# Patient Record
Sex: FEMALE | Race: WHITE | Employment: FULL TIME | URBAN - METROPOLITAN AREA
[De-identification: names, ages, dates, MRNs, and addresses within clinical notes are randomized per-mention and may not be internally consistent; named-entity substitution may affect disease eponyms.]

---

## 2019-09-25 ENCOUNTER — HOSPITAL ENCOUNTER (EMERGENCY)
Age: 59
Discharge: PSYCHIATRIC OR SUBSTANCE USE FACILITY | End: 2019-09-26
Attending: EMERGENCY MEDICINE
Payer: COMMERCIAL

## 2019-09-25 VITALS
OXYGEN SATURATION: 97 % | TEMPERATURE: 97.4 F | RESPIRATION RATE: 16 BRPM | HEART RATE: 86 BPM | SYSTOLIC BLOOD PRESSURE: 153 MMHG | DIASTOLIC BLOOD PRESSURE: 75 MMHG

## 2019-09-25 DIAGNOSIS — F31.2 BIPOLAR AFFECTIVE DISORDER, MANIC, SEVERE, WITH PSYCHOTIC BEHAVIOR (HCC): Primary | ICD-10-CM

## 2019-09-25 LAB
ALBUMIN SERPL-MCNC: 4 G/DL (ref 3.5–5)
ALBUMIN/GLOB SERPL: 1.3 {RATIO} (ref 1.1–2.2)
ALP SERPL-CCNC: 103 U/L (ref 45–117)
ALT SERPL-CCNC: 23 U/L (ref 12–78)
AMPHET UR QL SCN: NEGATIVE
ANION GAP SERPL CALC-SCNC: 7 MMOL/L (ref 5–15)
APPEARANCE UR: CLEAR
AST SERPL-CCNC: 12 U/L (ref 15–37)
BACTERIA URNS QL MICRO: NEGATIVE /HPF
BARBITURATES UR QL SCN: NEGATIVE
BASOPHILS # BLD: 0.1 K/UL (ref 0–0.1)
BASOPHILS NFR BLD: 1 % (ref 0–1)
BENZODIAZ UR QL: NEGATIVE
BILIRUB SERPL-MCNC: 0.2 MG/DL (ref 0.2–1)
BILIRUB UR QL: NEGATIVE
BUN SERPL-MCNC: 16 MG/DL (ref 6–20)
BUN/CREAT SERPL: 16 (ref 12–20)
CALCIUM SERPL-MCNC: 10 MG/DL (ref 8.5–10.1)
CANNABINOIDS UR QL SCN: NEGATIVE
CHLORIDE SERPL-SCNC: 106 MMOL/L (ref 97–108)
CO2 SERPL-SCNC: 27 MMOL/L (ref 21–32)
COCAINE UR QL SCN: NEGATIVE
COLOR UR: ABNORMAL
COMMENT, HOLDF: NORMAL
CREAT SERPL-MCNC: 0.97 MG/DL (ref 0.55–1.02)
DIFFERENTIAL METHOD BLD: NORMAL
DRUG SCRN COMMENT,DRGCM: NORMAL
EOSINOPHIL # BLD: 0.3 K/UL (ref 0–0.4)
EOSINOPHIL NFR BLD: 4 % (ref 0–7)
EPITH CASTS URNS QL MICRO: ABNORMAL /LPF
ERYTHROCYTE [DISTWIDTH] IN BLOOD BY AUTOMATED COUNT: 12.7 % (ref 11.5–14.5)
ETHANOL SERPL-MCNC: <10 MG/DL
GLOBULIN SER CALC-MCNC: 3.2 G/DL (ref 2–4)
GLUCOSE SERPL-MCNC: 106 MG/DL (ref 65–100)
GLUCOSE UR STRIP.AUTO-MCNC: NEGATIVE MG/DL
HCT VFR BLD AUTO: 45 % (ref 35–47)
HGB BLD-MCNC: 14.9 G/DL (ref 11.5–16)
HGB UR QL STRIP: NEGATIVE
HYALINE CASTS URNS QL MICRO: ABNORMAL /LPF (ref 0–5)
IMM GRANULOCYTES # BLD AUTO: 0 K/UL (ref 0–0.04)
IMM GRANULOCYTES NFR BLD AUTO: 0 % (ref 0–0.5)
KETONES UR QL STRIP.AUTO: NEGATIVE MG/DL
LEUKOCYTE ESTERASE UR QL STRIP.AUTO: ABNORMAL
LYMPHOCYTES # BLD: 1.9 K/UL (ref 0.8–3.5)
LYMPHOCYTES NFR BLD: 27 % (ref 12–49)
MCH RBC QN AUTO: 31.3 PG (ref 26–34)
MCHC RBC AUTO-ENTMCNC: 33.1 G/DL (ref 30–36.5)
MCV RBC AUTO: 94.5 FL (ref 80–99)
METHADONE UR QL: NEGATIVE
MONOCYTES # BLD: 0.5 K/UL (ref 0–1)
MONOCYTES NFR BLD: 7 % (ref 5–13)
NEUTS SEG # BLD: 4.2 K/UL (ref 1.8–8)
NEUTS SEG NFR BLD: 61 % (ref 32–75)
NITRITE UR QL STRIP.AUTO: NEGATIVE
NRBC # BLD: 0 K/UL (ref 0–0.01)
NRBC BLD-RTO: 0 PER 100 WBC
OPIATES UR QL: NEGATIVE
PCP UR QL: NEGATIVE
PH UR STRIP: 6.5 [PH] (ref 5–8)
PLATELET # BLD AUTO: 235 K/UL (ref 150–400)
PMV BLD AUTO: 10.1 FL (ref 8.9–12.9)
POTASSIUM SERPL-SCNC: 3.4 MMOL/L (ref 3.5–5.1)
PROT SERPL-MCNC: 7.2 G/DL (ref 6.4–8.2)
PROT UR STRIP-MCNC: NEGATIVE MG/DL
RBC # BLD AUTO: 4.76 M/UL (ref 3.8–5.2)
RBC #/AREA URNS HPF: ABNORMAL /HPF (ref 0–5)
SAMPLES BEING HELD,HOLD: NORMAL
SODIUM SERPL-SCNC: 140 MMOL/L (ref 136–145)
SP GR UR REFRACTOMETRY: <1.005 (ref 1–1.03)
UR CULT HOLD, URHOLD: NORMAL
UROBILINOGEN UR QL STRIP.AUTO: 0.2 EU/DL (ref 0.2–1)
WBC # BLD AUTO: 7 K/UL (ref 3.6–11)
WBC URNS QL MICRO: ABNORMAL /HPF (ref 0–4)

## 2019-09-25 PROCEDURE — 80053 COMPREHEN METABOLIC PANEL: CPT

## 2019-09-25 PROCEDURE — 99284 EMERGENCY DEPT VISIT MOD MDM: CPT

## 2019-09-25 PROCEDURE — 85025 COMPLETE CBC W/AUTO DIFF WBC: CPT

## 2019-09-25 PROCEDURE — 90791 PSYCH DIAGNOSTIC EVALUATION: CPT

## 2019-09-25 PROCEDURE — 74011250637 HC RX REV CODE- 250/637: Performed by: EMERGENCY MEDICINE

## 2019-09-25 PROCEDURE — 81001 URINALYSIS AUTO W/SCOPE: CPT

## 2019-09-25 PROCEDURE — 36415 COLL VENOUS BLD VENIPUNCTURE: CPT

## 2019-09-25 PROCEDURE — 80307 DRUG TEST PRSMV CHEM ANLYZR: CPT

## 2019-09-25 RX ORDER — ZIPRASIDONE HYDROCHLORIDE 20 MG/1
20 CAPSULE ORAL
Status: COMPLETED | OUTPATIENT
Start: 2019-09-25 | End: 2019-09-25

## 2019-09-25 RX ORDER — QUETIAPINE FUMARATE 25 MG/1
25-50 TABLET, FILM COATED ORAL
COMMUNITY

## 2019-09-25 RX ORDER — LORAZEPAM 0.5 MG/1
0.5 TABLET ORAL
Status: COMPLETED | OUTPATIENT
Start: 2019-09-25 | End: 2019-09-25

## 2019-09-25 RX ORDER — CALCIUM CARBONATE 200(500)MG
200 TABLET,CHEWABLE ORAL
Status: COMPLETED | OUTPATIENT
Start: 2019-09-25 | End: 2019-09-25

## 2019-09-25 RX ORDER — BUPROPION HYDROCHLORIDE 300 MG/1
300 TABLET ORAL
COMMUNITY

## 2019-09-25 RX ORDER — CARBAMAZEPINE 100 MG/1
100 CAPSULE, EXTENDED RELEASE ORAL
COMMUNITY

## 2019-09-25 RX ORDER — TOPIRAMATE 50 MG/1
50 TABLET, FILM COATED ORAL 2 TIMES DAILY
COMMUNITY

## 2019-09-25 RX ORDER — LORAZEPAM 1 MG/1
1 TABLET ORAL
Status: COMPLETED | OUTPATIENT
Start: 2019-09-25 | End: 2019-09-25

## 2019-09-25 RX ADMIN — CALCIUM CARBONATE (ANTACID) CHEW TAB 500 MG 200 MG: 500 CHEW TAB at 19:47

## 2019-09-25 RX ADMIN — LORAZEPAM 0.5 MG: 0.5 TABLET ORAL at 15:45

## 2019-09-25 RX ADMIN — LORAZEPAM 1 MG: 1 TABLET ORAL at 13:27

## 2019-09-25 RX ADMIN — ZIPRASIDONE HYDROCHLORIDE 20 MG: 20 CAPSULE ORAL at 19:47

## 2019-09-25 NOTE — ED PROVIDER NOTES
60 y.o. female with past medical history significant for bipolar disorder, breast CA who presents via private vehicle escorted by sisters with chief complaint of mental health problem. Per pt's sisters, pt's bipolar sx have been worsening over the past few weeks, acutely worsening in the past 3 days. Pt takes seroquel, topomax, and wellbutrin. Pt's sister reports that she has been only taking half doses of seroquel in an attempt to wean herself off, and has been supplementing with CBD. Pt has been staying with her sister for the past 3 days and has reportedly not slept, is becoming more combative and less compliant. Pt has a psychiatrist appointment tomorrow, but sisters were concerned about her safety at home. There are no other acute medical concerns at this time. Social hx: +tobacco smoker, +EtOH consumption     Full history, physical exam, and ROS unable to be obtained due to:  psychosis. Note written by Venita Morse, as dictated by Jered Alcala MD 1:05 PM      The history is provided by a relative. No  was used. Past Medical History:   Diagnosis Date    Bipolar affective ([facility] [address]     Cancer [facility])     breast cancer    Psychiatric disorder        No past surgical history on file. No family history on file.     Social History     Socioeconomic History    Marital status: SINGLE     Spouse name: Not on file    Number of children: Not on file    Years of education: Not on file    Highest education level: Not on file   Occupational History    Not on file   Social Needs    Financial resource strain: Not on file    Food insecurity:     Worry: Not on file     Inability: Not on file    Transportation needs:     Medical: Not on file     Non-medical: Not on file   Tobacco Use    Smoking status: Current Every Day Smoker    Smokeless tobacco: Never Used   Substance and Sexual Activity    Alcohol use: Yes     Comment: none since Sunday    Drug use: Not on file    Sexual activity: Not on file   Lifestyle    Physical activity:     Days per week: Not on file     Minutes per session: Not on file    Stress: Not on file   Relationships    Social connections:     Talks on phone: Not on file     Gets together: Not on file     Attends Confucianism service: Not on file     Active member of club or organization: Not on file     Attends meetings of clubs or organizations: Not on file     Relationship status: Not on file    Intimate partner violence:     Fear of current or ex partner: Not on file     Emotionally abused: Not on file     Physically abused: Not on file     Forced sexual activity: Not on file   Other Topics Concern    Not on file   Social History Narrative    Not on file         ALLERGIES: Patient has no known allergies. Review of Systems   Unable to perform ROS: Mental status change       Vitals:    09/25/19 1251   BP: 137/79   Pulse: 86   Resp: 20   Temp: 98.2 °F (36.8 °C)   SpO2: 95%            Physical Exam   Constitutional: She is oriented to person, place, and time. She appears well-developed and well-nourished. She appears distressed (agitated). HENT:   Head: Normocephalic and atraumatic. Eyes: Pupils are equal, round, and reactive to light. Conjunctivae are normal. Right eye exhibits no discharge. Left eye exhibits no discharge. No scleral icterus. Neck: Normal range of motion. Neck supple. Cardiovascular: Normal rate, regular rhythm and normal heart sounds. No murmur heard. Pulmonary/Chest: Effort normal and breath sounds normal.   Abdominal: Soft. There is no tenderness. Musculoskeletal: Normal range of motion. She exhibits no deformity. Neurological: She is alert and oriented to person, place, and time. Skin: Skin is warm and dry.    Psychiatric:   pressured speech, tangental nonlogical thoughts, poking examiner during exam, pointing to her eyes and then the monitor          MDM      Acutely agitated, appearing manic and lacking capacity. Pt unable to verbalize understanding of her condition or possible consequences of her decisions. Oldest sibling at bedside consenting to medical evaluation. Need to evaluate for acute medical condition for her symptoms. Check labs. Consult bsmart too. Ativan for significant agitation. Jarred Martinez MD      Procedures      2:57 PM  bsmart consulting [facility]. Pt more calm after ativan. Jarred Martinez MD    1600  Pending urine and likely TDO admission. Signed out patient to Dr. Ora Yi.   Jarred Martinez MD

## 2019-09-25 NOTE — PROGRESS NOTES
Admission Medication Reconciliation:    Information obtained from:  patient's sister, medication bottles, RxQuery  RxQuery data available¹:  YES    Comments/Recommendations: Updated PTA meds/reviewed patient's allergies. 1)  Discussed prescribed home medications with patient's sister who had the medication vials on hand that aligned with information in RxQuery. She stated that \"they are unsure if she [the patient] has been taking her medication. \" However, she is sure that as of three days ago they have been giving her the seroquel and topamax; noted that \"yesterday we actually gave her 150 mg of seroquel. \"    2)  Medication changes (since last review): Added  - all of the medications noted above    Adjusted  - NA    Removed  - carbamazepine  mg: 3 tabs q12h  - quetiapine 200 mg nightly  - temazepam 30 mg nightly     [facility] benefit data reflects medications filled and processed through the patient's insurance, however   this data does NOT capture whether the medication was picked up or is currently being taken by the patient. Allergies:  Patient has no known allergies. Significant PMH/Disease States:   Past Medical History:   Diagnosis Date    Bipolar affective ([facility] [address]     Cancer [facility])     breast cancer    Psychiatric disorder      Chief Complaint for this Admission:    Chief Complaint   Patient presents with    Mental Health Problem     Prior to Admission Medications:   Prior to Admission Medications   Prescriptions Last Dose Informant Patient Reported? Taking? QUEtiapine (SEROQUEL) 25 mg tablet 9/24/2019 at Unknown time  Yes Yes   Sig: Take 25-50 mg by mouth nightly. buPROPion XL (WELLBUTRIN XL) 300 mg XL tablet   Yes Yes   Sig: Take 300 mg by mouth every morning. carBAMazepine ER (CARBATROL ER) 100 mg capsule   Yes Yes   Sig: Take 100 mg by mouth every morning. topiramate (TOPAMAX) 50 mg tablet 9/24/2019 at Unknown time  Yes Yes   Sig: Take 50 mg by mouth two (2) times a day. Facility-Administered Medications: None       Please contact the main inpatient pharmacy with any questions or concerns at [phone] and we will direct you to the clinical pharmacist covering this patient's care while in-house.    Sejal Goins

## 2019-09-25 NOTE — ED TRIAGE NOTES
Triage: Patient arrives from home with c/o maniac episode that started two days ago. Sisters have been taking care of patient at home but agitation has increased so they decided to bring in patient. They believed patient stopped taking her Seroquel.

## 2019-09-25 NOTE — BSMART NOTE
Behavioral Health [place] 1- Bi Polar D/O Manic Axis 11- N/A Axis 111-History of Breast CA Insurance-[facility] Patient is a 60year old female who was brought to the ED by her sisters for possible psychiatric hospitalization. Patient has a diagnosis of bi-polar. Information was obtained from patients sisters because patients thought were loose, tangential and disorganized. According to her family, over the past few days she has decompensated and is now in a manic state. She is unable to make an informed decision regarding her treatment and care and a TDO evaluation is in order. Plan: TC to [facility] requesting a TDO evaluation. Crisis will come to the ED to evaluate patient. BSMART will fax the [facility] note and Facesheet to [phone].  
 
Patrick Rodriguez [facility]

## 2019-09-25 NOTE — ED NOTES
1350: Patient resting in stretcher with sisters at bedside. Patient is singing \"ymca\". Patient is in line of sight from nursing station. 1450: Patient resting quietly in line of sight from nursing station. Patient is drinking fluids encouraging urine output. 1550: Patient becoming a little more agitated. Ativan given to patient. 1650: Patient resting in stretcher with no complaints. Sisters are at bedside     1913: Patient singing at nursing station. Patient escorted back to room.

## 2019-09-26 NOTE — ED NOTES
Emi Singleton from [address] [place] [address] called requesting for lab work and nursing notes to be faxed to her [phone]

## 2019-09-26 NOTE — ED NOTES
Verbal shift change report received from Albert Huerta RN(offgoing nurse). Report included the following information SBAR, ED Summary, MAR and Recent Results.

## 2019-09-26 NOTE — ED NOTES
Pt transported by [facility] PD in stable condition.  EMTALA form signed by RN, MD, CPD and charge RN

## 2019-09-26 NOTE — ED NOTES
Zoe from [address] [place] [address] states that pt was accepted at the [facility]. [phone]. Accepting MD: Dr. Jeyson Cardenas.  Report to be called upon deputy showing up with TDO

## 2019-09-26 NOTE — PROGRESS NOTES
Attempted to place pt on CPAP as ordered, pt began to have some manic episodes and spit out water towards myself and pt's sister. Pt's sister asked that I not place pt on CPAP at this time. RN has been made aware.

## 2019-09-26 NOTE — ED NOTES
0045 resting on stretcher with eyes closed, occasional snoring and occasionally restless. 7708 [place] PD at the bedside with TDO. TDO scanned into pt's chart. Report called to Amee Westbrook RN at the [facility].

## 2019-09-26 NOTE — ED NOTES
2015 pt sitting on stretcher restless and singing. Family at the bedside. Continues to be visible from the nurses station. 2030 continues to sit on stretcher. Pt continue to be restless. Pillow provided for comfort. Family at the bedside. 2100 pt laying on stretcher. No s/s of acute distress noted. 2125 family requesting cpap for pt. States pt fell asleep but woke up when she began snoring. Pt on stretcher with eyes closed and mumbling. 2147 pt resting on her LEFT. 2210 RT at the bedside attempting to place cpap on pt. Pt agitated. Family asked RT to try again later. 2300 pt resting with eyes closed on her LEFT side. Family at the bedside. 2348 pt resting with eyes closed on her RIGHT side. Family provided with blankets.

## 2023-01-30 ENCOUNTER — HOSPITAL ENCOUNTER (INPATIENT)
Age: 63
LOS: 3 days | Discharge: HOME | DRG: 753 | End: 2023-02-02
Attending: EMERGENCY MEDICINE | Admitting: PSYCHIATRY & NEUROLOGY
Payer: COMMERCIAL

## 2023-01-30 DIAGNOSIS — R45.851 SUICIDAL IDEATION: Primary | ICD-10-CM

## 2023-01-30 PROBLEM — F31.9 BIPOLAR 1 DISORDER (HCC): Status: ACTIVE | Noted: 2023-01-30

## 2023-01-30 LAB
ALBUMIN SERPL-MCNC: 3.7 G/DL (ref 3.5–5)
ALBUMIN/GLOB SERPL: 1.1 (ref 1.1–2.2)
ALP SERPL-CCNC: 75 U/L (ref 45–117)
ALT SERPL-CCNC: 25 U/L (ref 12–78)
AMPHET UR QL SCN: NEGATIVE
ANION GAP SERPL CALC-SCNC: 1 MMOL/L (ref 5–15)
APAP SERPL-MCNC: <2 UG/ML (ref 10–30)
APPEARANCE UR: CLEAR
AST SERPL-CCNC: 15 U/L (ref 15–37)
BACTERIA URNS QL MICRO: NEGATIVE /HPF
BARBITURATES UR QL SCN: NEGATIVE
BASOPHILS # BLD: 0.1 K/UL (ref 0–0.1)
BASOPHILS NFR BLD: 1 % (ref 0–1)
BENZODIAZ UR QL: NEGATIVE
BILIRUB SERPL-MCNC: 0.4 MG/DL (ref 0.2–1)
BILIRUB UR QL: NEGATIVE
BUN SERPL-MCNC: 12 MG/DL (ref 6–20)
BUN/CREAT SERPL: 17 (ref 12–20)
CALCIUM SERPL-MCNC: 9.1 MG/DL (ref 8.5–10.1)
CANNABINOIDS UR QL SCN: NEGATIVE
CHLORIDE SERPL-SCNC: 107 MMOL/L (ref 97–108)
CO2 SERPL-SCNC: 27 MMOL/L (ref 21–32)
COCAINE UR QL SCN: NEGATIVE
COLOR UR: ABNORMAL
CREAT SERPL-MCNC: 0.7 MG/DL (ref 0.55–1.02)
DIFFERENTIAL METHOD BLD: NORMAL
DRUG SCRN COMMENT,DRGCM: NORMAL
EOSINOPHIL # BLD: 0.4 K/UL (ref 0–0.4)
EOSINOPHIL NFR BLD: 6 % (ref 0–7)
EPITH CASTS URNS QL MICRO: ABNORMAL /LPF
ERYTHROCYTE [DISTWIDTH] IN BLOOD BY AUTOMATED COUNT: 12.6 % (ref 11.5–14.5)
ETHANOL SERPL-MCNC: <10 MG/DL
FLUAV RNA SPEC QL NAA+PROBE: NOT DETECTED
FLUBV RNA SPEC QL NAA+PROBE: NOT DETECTED
GLOBULIN SER CALC-MCNC: 3.3 G/DL (ref 2–4)
GLUCOSE SERPL-MCNC: 118 MG/DL (ref 65–100)
GLUCOSE UR STRIP.AUTO-MCNC: NEGATIVE MG/DL
HCT VFR BLD AUTO: 42.9 % (ref 35–47)
HGB BLD-MCNC: 14.2 G/DL (ref 11.5–16)
HGB UR QL STRIP: NEGATIVE
HYALINE CASTS URNS QL MICRO: ABNORMAL /LPF (ref 0–5)
IMM GRANULOCYTES # BLD AUTO: 0 K/UL (ref 0–0.04)
IMM GRANULOCYTES NFR BLD AUTO: 0 % (ref 0–0.5)
KETONES UR QL STRIP.AUTO: NEGATIVE MG/DL
LEUKOCYTE ESTERASE UR QL STRIP.AUTO: ABNORMAL
LYMPHOCYTES # BLD: 1.6 K/UL (ref 0.8–3.5)
LYMPHOCYTES NFR BLD: 26 % (ref 12–49)
MCH RBC QN AUTO: 31.4 PG (ref 26–34)
MCHC RBC AUTO-ENTMCNC: 33.1 G/DL (ref 30–36.5)
MCV RBC AUTO: 94.9 FL (ref 80–99)
METHADONE UR QL: NEGATIVE
MONOCYTES # BLD: 0.5 K/UL (ref 0–1)
MONOCYTES NFR BLD: 8 % (ref 5–13)
NEUTS SEG # BLD: 3.8 K/UL (ref 1.8–8)
NEUTS SEG NFR BLD: 59 % (ref 32–75)
NITRITE UR QL STRIP.AUTO: NEGATIVE
NRBC # BLD: 0 K/UL (ref 0–0.01)
NRBC BLD-RTO: 0 PER 100 WBC
OPIATES UR QL: NEGATIVE
PCP UR QL: NEGATIVE
PH UR STRIP: 7.5 (ref 5–8)
PLATELET # BLD AUTO: 258 K/UL (ref 150–400)
PMV BLD AUTO: 10.1 FL (ref 8.9–12.9)
POTASSIUM SERPL-SCNC: 3.7 MMOL/L (ref 3.5–5.1)
PROT SERPL-MCNC: 7 G/DL (ref 6.4–8.2)
PROT UR STRIP-MCNC: NEGATIVE MG/DL
RBC # BLD AUTO: 4.52 M/UL (ref 3.8–5.2)
RBC #/AREA URNS HPF: ABNORMAL /HPF (ref 0–5)
SALICYLATES SERPL-MCNC: 3.3 MG/DL (ref 2.8–20)
SARS-COV-2, COV2: NOT DETECTED
SODIUM SERPL-SCNC: 135 MMOL/L (ref 136–145)
SP GR UR REFRACTOMETRY: 1 (ref 1–1.03)
UR CULT HOLD, URHOLD: NORMAL
UROBILINOGEN UR QL STRIP.AUTO: 0.2 EU/DL (ref 0.2–1)
WBC # BLD AUTO: 6.4 K/UL (ref 3.6–11)
WBC URNS QL MICRO: ABNORMAL /HPF (ref 0–4)

## 2023-01-30 PROCEDURE — 65220000003 HC RM SEMIPRIVATE PSYCH

## 2023-01-30 PROCEDURE — 80179 DRUG ASSAY SALICYLATE: CPT

## 2023-01-30 PROCEDURE — 99285 EMERGENCY DEPT VISIT HI MDM: CPT

## 2023-01-30 PROCEDURE — 85025 COMPLETE CBC W/AUTO DIFF WBC: CPT

## 2023-01-30 PROCEDURE — 74011250636 HC RX REV CODE- 250/636: Performed by: PSYCHIATRY & NEUROLOGY

## 2023-01-30 PROCEDURE — 81001 URINALYSIS AUTO W/SCOPE: CPT

## 2023-01-30 PROCEDURE — 80307 DRUG TEST PRSMV CHEM ANLYZR: CPT

## 2023-01-30 PROCEDURE — 36415 COLL VENOUS BLD VENIPUNCTURE: CPT

## 2023-01-30 PROCEDURE — 82077 ASSAY SPEC XCP UR&BREATH IA: CPT

## 2023-01-30 PROCEDURE — 74011250637 HC RX REV CODE- 250/637: Performed by: PSYCHIATRY & NEUROLOGY

## 2023-01-30 PROCEDURE — 87636 SARSCOV2 & INF A&B AMP PRB: CPT

## 2023-01-30 PROCEDURE — 80143 DRUG ASSAY ACETAMINOPHEN: CPT

## 2023-01-30 PROCEDURE — 80053 COMPREHEN METABOLIC PANEL: CPT

## 2023-01-30 RX ORDER — HYDROXYZINE 50 MG/1
50 TABLET, FILM COATED ORAL
Status: DISCONTINUED | OUTPATIENT
Start: 2023-01-30 | End: 2023-02-02 | Stop reason: HOSPADM

## 2023-01-30 RX ORDER — ADHESIVE BANDAGE
30 BANDAGE TOPICAL DAILY PRN
Status: DISCONTINUED | OUTPATIENT
Start: 2023-01-30 | End: 2023-02-02 | Stop reason: HOSPADM

## 2023-01-30 RX ORDER — TRAZODONE HYDROCHLORIDE 50 MG/1
50 TABLET ORAL
Status: DISCONTINUED | OUTPATIENT
Start: 2023-01-30 | End: 2023-02-02 | Stop reason: HOSPADM

## 2023-01-30 RX ORDER — OLANZAPINE 5 MG/1
5 TABLET ORAL
Status: DISCONTINUED | OUTPATIENT
Start: 2023-01-30 | End: 2023-02-02 | Stop reason: HOSPADM

## 2023-01-30 RX ORDER — HALOPERIDOL 5 MG/ML
5 INJECTION INTRAMUSCULAR
Status: DISCONTINUED | OUTPATIENT
Start: 2023-01-30 | End: 2023-02-02 | Stop reason: HOSPADM

## 2023-01-30 RX ORDER — DIPHENHYDRAMINE HYDROCHLORIDE 50 MG/ML
50 INJECTION, SOLUTION INTRAMUSCULAR; INTRAVENOUS
Status: DISCONTINUED | OUTPATIENT
Start: 2023-01-30 | End: 2023-02-02 | Stop reason: HOSPADM

## 2023-01-30 RX ORDER — LORAZEPAM 2 MG/ML
1 INJECTION, SOLUTION INTRAMUSCULAR; INTRAVENOUS
Status: DISCONTINUED | OUTPATIENT
Start: 2023-01-30 | End: 2023-02-02 | Stop reason: HOSPADM

## 2023-01-30 RX ORDER — ACETAMINOPHEN 325 MG/1
650 TABLET ORAL
Status: DISCONTINUED | OUTPATIENT
Start: 2023-01-30 | End: 2023-02-02 | Stop reason: HOSPADM

## 2023-01-30 RX ORDER — BENZTROPINE MESYLATE 1 MG/1
1 TABLET ORAL
Status: DISCONTINUED | OUTPATIENT
Start: 2023-01-30 | End: 2023-02-02 | Stop reason: HOSPADM

## 2023-01-30 RX ADMIN — TRAZODONE HYDROCHLORIDE 50 MG: 50 TABLET ORAL at 22:15

## 2023-01-30 RX ADMIN — HALOPERIDOL LACTATE 5 MG: 5 INJECTION, SOLUTION INTRAMUSCULAR at 18:46

## 2023-01-30 RX ADMIN — LORAZEPAM 1 MG: 2 INJECTION, SOLUTION INTRAMUSCULAR; INTRAVENOUS at 18:46

## 2023-01-30 RX ADMIN — HYDROXYZINE HYDROCHLORIDE 50 MG: 50 TABLET ORAL at 22:15
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
## 2023-01-30 NOTE — ED TRIAGE NOTES
Denies HI, denies SI, denies visual or auditory hallucinations, pt stated she is feeling great, no complaints voiced, sister stated she is experiencing manic behavior, her friends have  been calling

## 2023-01-30 NOTE — BSMART NOTE
BSMART assessment completed, and suicide risk level noted to be no risk. Charge Nurse, Manpreet Hugo and Physician, Dr. Shahzad Sims notified. Concerns not observed. Security/Off- has not been notified.
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
## 2023-01-30 NOTE — ED NOTES
Pt attempting to leave ed. According to [facility], the patient appears to be in the middle of a manic episode and their recommendation is to contact crisis and possibly have the patient stay as an involuntary hold. MD updated the patient that in the case of her leaving, the police will be called to bring her back, patient stated understanding. Patient's main reason for wanting to leave the ED is not being able to smoke a cigarette, md offered a nicotine patch, pt refused. Pt now sitting in bed, family at bedside. Sitter now also at bedside.

## 2023-01-30 NOTE — ED NOTES
TRANSFER - OUT REPORT:    Verbal report given to ANAHY Coelho(name) on Jeff Salvage  being transferred to 733(unit) for routine progression of care       Report consisted of patients Situation, Background, Assessment and   Recommendations(SBAR). Information from the following report(s) SBAR, ED Summary, Intake/Output, MAR, and Recent Results was reviewed with the receiving nurse. Lines:       Opportunity for questions and clarification was provided.       Patient transported with:   Tech and security
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
## 2023-01-30 NOTE — BSMART NOTE
Spoke w/ Jordan Goldsmith w/ [facility] via TC to relay TDO prescreen request.  [facility] for review. 1550: Received call from Jordan Goldsmith who confirms pt is currently voluntary for admission and is recommending a TDO if pt becomes involuntary. Notified Maria Abraham w/ [facility].

## 2023-01-30 NOTE — BSMART NOTE
Received confirmation from [facility] w/ Access that pt has been accepted to [facility] by Edward/Dorothy and will be placed in bed #733. Notified Sam Herrera RN.

## 2023-01-31 PROCEDURE — 74011250637 HC RX REV CODE- 250/637: Performed by: PSYCHIATRY & NEUROLOGY

## 2023-01-31 PROCEDURE — 65220000003 HC RM SEMIPRIVATE PSYCH

## 2023-01-31 PROCEDURE — 74011250636 HC RX REV CODE- 250/636: Performed by: PSYCHIATRY & NEUROLOGY

## 2023-01-31 RX ORDER — TRAZODONE HYDROCHLORIDE 100 MG/1
100-200 TABLET ORAL
COMMUNITY
End: 2023-02-02

## 2023-01-31 RX ORDER — IBUPROFEN 200 MG
1 TABLET ORAL DAILY
Status: DISCONTINUED | OUTPATIENT
Start: 2023-01-31 | End: 2023-02-01

## 2023-01-31 RX ORDER — ZIPRASIDONE HYDROCHLORIDE 20 MG/1
20 CAPSULE ORAL
COMMUNITY
End: 2023-02-02

## 2023-01-31 RX ORDER — ZIPRASIDONE HYDROCHLORIDE 40 MG/1
40 CAPSULE ORAL
COMMUNITY
End: 2023-02-02

## 2023-01-31 RX ORDER — MELOXICAM 15 MG/1
15 TABLET ORAL
COMMUNITY
End: 2023-02-02

## 2023-01-31 RX ORDER — ZIPRASIDONE HYDROCHLORIDE 20 MG/1
40 CAPSULE ORAL 2 TIMES DAILY WITH MEALS
Status: DISCONTINUED | OUTPATIENT
Start: 2023-01-31 | End: 2023-02-01

## 2023-01-31 RX ADMIN — HALOPERIDOL LACTATE 5 MG: 5 INJECTION, SOLUTION INTRAMUSCULAR at 09:21

## 2023-01-31 RX ADMIN — LORAZEPAM 1 MG: 2 INJECTION, SOLUTION INTRAMUSCULAR; INTRAVENOUS at 09:21

## 2023-01-31 RX ADMIN — DIPHENHYDRAMINE HYDROCHLORIDE 50 MG: 50 INJECTION, SOLUTION INTRAMUSCULAR; INTRAVENOUS at 09:20

## 2023-01-31 RX ADMIN — ZIPRASIDONE HYDROCHLORIDE 40 MG: 20 CAPSULE ORAL at 17:12
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
## 2023-01-31 NOTE — BH NOTES
Patient is labile, pressured speech, demanding to leave. Pushing on doors, unable to be redirected. Sitting of floor near javier, banging on door. @1927 patient is escalating agitated behaviors. Demanding to leave, cursing at staff, pushing on doors, threatening  staff. Security called. PRN Medication Documentation    Specific patient behavior that led to need for PRN medication: see notes above  Staff interventions attempted prior to PRN being given: redirection, coloring, therapeutic listening,   PRN medication given: ativan 1 mg IM; haldol 5 mg IM; benadryl 50 mg IM  Patient response/effectiveness of PRN medication: pending     @ [phone] patient is yelling at staff, banging on door of geriatric unit. Instructed to stay away from door and not bang on door.  Patient responds with cursing at staff and verbal threats    1000 patient is beligarant, demanding food, banging on door

## 2023-01-31 NOTE — PROGRESS NOTES
Admission Medication Reconciliation:    Information obtained from:  Insurance claims data, review of EMR, medication list, [facility]  [facility] available¹:  YES    Comments/Recommendations: Updated PTA meds/reviewed patient's allergies. 1)  This writer did not meet with the patient to review PTA medications. Information was collected via dispense history, past EMR notes, and interview with the treatment team. Patient has been filling ziprasidone 40 mg since July 2022. Ziprasidone 20 mg was also filled on 1/26/23 with the instruction to take with the 40 mg capsule (total daily dose of 60 mg). Patient was previously on quetiapine, temazepam, and carbamazepine xr back in 2014. 2)  The [place] Prescription Monitoring Program () was assessed to determine fill history of any controlled medications. The patient has NOT filled any controlled medications in the last 12 months. 3)  Medication changes (since last review): Added  - meloxicam 15 mg as needed for pain  - trazodone 100 mg to 200 mg as needed for insomnia    Changes  - ziprasidone 60 mg nightly (taking 40 mg & 20 mg capsules together)    Removed  - quetiapine 25 mg nightly  - carbamazepine  mg every morning  - bupropion xl 300 mg every morning  - topiramate 50 mg twice daily   ¹RxQuery pharmacy benefit data reflects medications filled and processed through the patient's insurance, however this data does NOT capture whether the medication was picked up or is currently being taken by the patient. Allergies:  Patient has no known allergies.     Significant PMH/Disease States:   Past Medical History:   Diagnosis Date    Bipolar affective ([facility]     Cancer [facility])     breast cancer    Psychiatric disorder      Chief Complaint for this Admission:    Chief Complaint   Patient presents with    Mental Health Problem     Prior to Admission Medications:   Prior to Admission Medications   Prescriptions Last Dose Informant Taking?   meloxicam (MOBIC) 15 mg tablet   Yes   Sig: Take 15 mg by mouth daily as needed for Pain. traZODone (DESYREL) 100 mg tablet   Yes   Sig: Take 100-200 mg by mouth nightly as needed for Sleep. ziprasidone (GEODON) 20 mg capsule   Yes   Sig: Take 20 mg by mouth nightly. (Take with 40 mg capsule)   ziprasidone (GEODON) 40 mg capsule   Yes   Sig: Take 40 mg by mouth nightly.  (Take with 20 mg capsule)      Facility-Administered Medications: None     Ghulam Fish PharmD Candidate Class of 2023

## 2023-01-31 NOTE — BH NOTES
PRN Medication Documentation    Specific patient behavior that led to need for PRN medication: Pt reports trouble sleeping. Staff interventions attempted prior to PRN being given: Therapeutic communication. PRN medication given: Atarax 50 mg, trazodone 50mg   Patient response/effectiveness of PRN medication: Pt awaiting results.

## 2023-01-31 NOTE — PROGRESS NOTES
Behavioral Services  Medicare Certification Upon Admission    I certify that this patient's inpatient psychiatric hospital admission is medically necessary for:    [x] (1) Treatment which could reasonably be expected to improve this patient's condition,       [] (2) Or for diagnostic study;     AND     [x](2) The inpatient psychiatric services are provided while the individual is under the care of a physician and are included in the individualized plan of care.     Estimated length of stay/service 3-5 days    Plan for post-hospital care Outpatient Care    Electronically signed by Christina Hughes MD on 1/31/2023 at 10:30 AM

## 2023-01-31 NOTE — PROGRESS NOTES
Problem: Altered Thought Process (Adult/Pediatric)  Goal: *STG: Participates in treatment plan  Outcome: Progressing Towards Goal  Goal: *STG: Seeks staff when feelings of anxiety and fear arise  Outcome: Progressing Towards Goal       Pt alert but confused. Pt isolative to self, pt originally refused night time dose of Geodon, but after redirection pt agreed to take medication. Pt continues to push on doors in attempt to elope.

## 2023-01-31 NOTE — BH NOTES
@ 0731 40 [phone] patient stated, Charito Cords me my fucking CPAP right fucking now\". When asked if she knew the settings she responded with \"I don't fucking know the settings just give me the damn thing\". RN called respiratory who stated they don't set up home CPAP machines. Management notified. Patient notified. @ 0000 Patient asked for CPAP again, stating it was an emergency and still endorsing not knowing the settings. RN did a continuous pulse ox reading for 10 minutes while patient fell asleep, the readings stayed above 93% resting on 97% for the majority of the 10 minutes. No distress noted. Will continue to monitor pt closely.

## 2023-01-31 NOTE — BH NOTES
GROUP THERAPY PROGRESS NOTE    Patient did not participate in Healthy Living  group.     Wilian Buenrostro MSW, QMHP-A

## 2023-01-31 NOTE — PROGRESS NOTES
Problem: Falls - Risk of  Goal: *Absence of Falls  Description: Document Flaquita Roselia Fall Risk and appropriate interventions in the flowsheet. Outcome: Progressing Towards Goal  Note: Fall Risk Interventions:       Patient received, restless,, irritable, fell asleep within the hour, eyes closed, breathing even and unlabored. No signs of distress noted. Will continue to monitor for safety.
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
## 2023-01-31 NOTE — INTERDISCIPLINARY ROUNDS
Behavioral Health Interdisciplinary Rounds     Patient Name: Sandra Cummins  Age: 60 y.o. Room/Bed:  0/  Primary Diagnosis: <principal problem not specified>   Admission Status: TDO Hearing will be Thursday at 7:30     Readmission within 30 days: no  Power of  in place: no  Patient requires a blocked bed:    yes      Reason for blocked bed:manic and disputive behavior       Flu Vaccine : no   Consults:          Labs/Testing due today? No  Have they refused labs?: no    Sleep hours: 4       Participation in Care/Groups:  n/a  Medication Compliant?:     PRNS (last 24 hours): Antipsychotic (IM), Antianxiety, and Sleep Aid    Restraints (last 24 hours):  no     CIWA (range last 24 hours):     COWS (range last 24 hours):      Alcohol screening (AUDIT) completed -         If applicable, date SBIRT discussed in treatment team AND documented:   AUDIT Screen Score:      ________________________________________________________________  OQ Admission Analysis Survey completed:  OQ Admission Analysis Survey score:  OQ Discharge Analysis Survey completed:  OQ Discharge Analysis Survey score:     _____________________________________________________________________    Tobacco - patient is a smoker: Have You Used Tobacco in the Past 30 Days: Yes  Illegal Drugs use: Have You Used Any Illegal Substances Over the Past 12 Months: No    24 hour chart check complete: no   ____________________________________________________________________________________________________________    Patient goal(s) for today:   Treatment team focus/goals: Plan to set up her hearing and assess for medications and discharge needs   Progress note :She was manic and easily agitated in treatment team.  Unable to complete her assessment. LUIZA spoke to patient sister Sally Lloyd and updated her on the treatment plan. Family wants to participate in her hearing.       LOS:  1  Expected LOS: TBD     Financial concerns/prescription coverage:  [employer] [facility]   Family contact:  sister      Family requesting physician contact today:  no  Discharge plan: she will return to her home   Access to weapons :  no        Outpatient provider(s): Dr. Mercedez Springer  Patient's preferred phone number for follow up call :   Patient's preferred e-mail address :  Participating treatment team members: Tran Ross Dr. - Melissa Mcdowell-

## 2023-01-31 NOTE — PROGRESS NOTES
Problem: Discharge Planning  Goal: *Discharge to safe environment  Outcome: Progressing Towards Goal  Note: She has supportive family   She has established outpatient treatment providers     Goal: *Knowledge of medication management  Outcome: Progressing Towards Goal  Note: Plan to start her medications   Goal: *Knowledge of discharge instructions  Note: She is able to verbalize discharge instructions

## 2023-02-01 PROCEDURE — 74011250637 HC RX REV CODE- 250/637: Performed by: PSYCHIATRY & NEUROLOGY

## 2023-02-01 PROCEDURE — 65220000001 HC RM PRIVATE PSYCH

## 2023-02-01 RX ORDER — QUETIAPINE FUMARATE 100 MG/1
100 TABLET, FILM COATED ORAL
Status: DISCONTINUED | OUTPATIENT
Start: 2023-02-01 | End: 2023-02-02 | Stop reason: HOSPADM

## 2023-02-01 RX ORDER — TOPIRAMATE 25 MG/1
25 TABLET ORAL 2 TIMES DAILY WITH MEALS
Status: DISCONTINUED | OUTPATIENT
Start: 2023-02-01 | End: 2023-02-02 | Stop reason: HOSPADM

## 2023-02-01 RX ORDER — DM/P-EPHED/ACETAMINOPH/DOXYLAM 30-7.5/3
2 LIQUID (ML) ORAL
Status: DISCONTINUED | OUTPATIENT
Start: 2023-02-01 | End: 2023-02-02 | Stop reason: HOSPADM

## 2023-02-01 RX ORDER — QUETIAPINE FUMARATE 25 MG/1
50 TABLET, FILM COATED ORAL DAILY
Status: DISCONTINUED | OUTPATIENT
Start: 2023-02-02 | End: 2023-02-02 | Stop reason: HOSPADM

## 2023-02-01 RX ADMIN — TRAZODONE HYDROCHLORIDE 50 MG: 50 TABLET ORAL at 23:32

## 2023-02-01 RX ADMIN — QUETIAPINE FUMARATE 100 MG: 100 TABLET ORAL at 20:16

## 2023-02-01 RX ADMIN — ZIPRASIDONE HYDROCHLORIDE 40 MG: 20 CAPSULE ORAL at 08:30

## 2023-02-01 RX ADMIN — TOPIRAMATE 25 MG: 25 TABLET, FILM COATED ORAL at 17:03

## 2023-02-01 NOTE — PROGRESS NOTES
Patient was not available, she was meeting with treatment team when I made rounds.   Father Germaine Rapp

## 2023-02-01 NOTE — BH NOTES
GROUP THERAPY PROGRESS NOTE    Patient did not participate in Goals group.     Wilian Buenrostro MSW, QMHP-A

## 2023-02-01 NOTE — PROGRESS NOTES
Problem: Altered Thought Process (Adult/Pediatric)  Goal: *STG: Participates in treatment plan  Outcome: Progressing Towards Goal  Goal: *STG: Remains safe in hospital  Outcome: Progressing Towards Goal  Goal: *STG: Complies with medication therapy  Outcome: Progressing Towards Goal     Problem: Patient Education: Go to Patient Education Activity  Goal: Patient/Family Education  Outcome: Progressing Towards Goal

## 2023-02-01 NOTE — PROGRESS NOTES
Problem: Falls - Risk of  Goal: *Absence of Falls  Description: Document Natalie Call Fall Risk and appropriate interventions in the flowsheet. Outcome: Progressing Towards Goal  Note: Fall Risk Interventions:  Mobility Interventions: Assess mobility with egress test    Mentation Interventions: Door open when patient unattended    Medication Interventions: Teach patient to arise slowly     Patient received resting quietly in bed. No signs of distress. Even and unlabored breathing. Staff will continue to monitor on 1:1 while CPAP is in use and provide support. 0320: Patient awake pacing room, tangentially rambling. Patient offered and agreed to take po prn medication. Staff witnessed patient hid medication under tongue and proceed to spit and flush medication down the toilet. Education provided on the importance of medication compliance. Staff will continue to monitor and support.

## 2023-02-01 NOTE — INTERDISCIPLINARY ROUNDS
Behavioral Health Interdisciplinary Rounds     Patient Name: Shwetha Kimble  Age: 60 y.o. Room/Bed:  730/02  Primary Diagnosis: <principal problem not specified>   Admission Status: TDO     Readmission within 30 days: no  Power of  in place: no  Patient requires a blocked bed: yes          Reason for blocked bed: behavior  Sleep hours: 2       Participation in Care/Groups:  no  Medication Compliant?: Selective  PRNS (last 24 hours): Antipsychotic (IM)    Restraints (last 24 hours):  no  __________________________________________________  OQ Admission Analysis Survey completed:  OQ Admission Analysis Survey score:  OQ Discharge Analysis Survey completed:  OQ Discharge Analysis Survey score:   __________________________________________________     Alcohol screening (AUDIT) completed -  AUDIT Score: 0  If applicable, date SBIRT discussed in treatment team AND documented:    Tobacco - patient is a smoker: Have You Used Tobacco in the Past 30 Days: Yes  Illegal Drugs use: Have You Used Any Illegal Substances Over the Past 12 Months: No    24 hour chart check complete: yes     _______________________________________________    Patient goal(s) for today: Communicate needs to staff   Treatment team focus/goals: Assess pt, manage medications, discharge planning   Progress note: Pts mood has improved since yesterday. Pt has brighter affect. Pt has tried to elope several times, otherwise is compliant with tx. Pts medications have been adjusted. Pt gave permission for Dr. Luis Golden to speak with pts sister who works here.       Spiritual Care Consult:   Financial concerns/prescription coverage:    Family contact:       Baron Ortiz, sister [phone]                 Family requesting physician contact today:    Discharge plan: TBD   Access to weapons :  no                                                             Outpatient provider(s):   Patient's preferred phone number for follow up call :   Patient's preferred e-mail address :    LOS:  2  Expected LOS: TBD     Participating treatment team members: Arcelia Mccullough, YAO Aggarwal, Marilyn Schulte RN, Dr. Swati Aguilera

## 2023-02-01 NOTE — PROGRESS NOTES
Chief Complaint:  \"I swing high when I manic. \"    Length of Stay: 2 Days    Interval History:  Nursing reports patient slept 2 hours. Patient tells me she slept soundly and felt very restful. Berna Nelson recounts her initial manic presentation at 27 in the midst of several stressors, including her father's death, a change of jobs and providers. She tells me that she usually 'swings high' and has some warning signs. She tells the treatment team how much she trusts her psychiatrist now, whom she contacted about medication recommendations because of recent difficulty with sleep. Her psychiatrist recommended an increase in PRN trazodone, but to no avail. Patient was agreeable to cross taper from geodone to seroquel but wanted to combine that with topamax for weight control and mood. She gave me permission to speak with her sister. Marcia Collins. I will reach out to her. Past Medical History:  Past Medical History:   Diagnosis Date    Bipolar affective ([facility] [address]     Cancer ([facility] [address]     breast cancer    Psychiatric disorder            Labs:  Lab Results   Component Value Date/Time    WBC 6.4 01/30/2023 12:06 PM    HGB 14.2 01/30/2023 12:06 PM    HCT 42.9 01/30/2023 12:06 PM    PLATELET 097 30/79/3702 12:06 PM    MCV 94.9 01/30/2023 12:06 PM      Lab Results   Component Value Date/Time    Sodium 135 (L) 01/30/2023 12:06 PM    Potassium 3.7 01/30/2023 12:06 PM    Chloride 107 01/30/2023 12:06 PM    CO2 27 01/30/2023 12:06 PM    Anion gap 1 (L) 01/30/2023 12:06 PM    Glucose 118 (H) 01/30/2023 12:06 PM    Glucose 87 01/31/2014 06:54 AM    BUN 12 01/30/2023 12:06 PM    Creatinine 0.70 01/30/2023 12:06 PM    BUN/Creatinine ratio 17 01/30/2023 12:06 PM    GFR est AA >60 09/25/2019 02:38 PM    GFR est non-AA 59 (L) 09/25/2019 02:38 PM    Calcium 9.1 01/30/2023 12:06 PM    Bilirubin, total 0.4 01/30/2023 12:06 PM    Alk.  phosphatase 75 01/30/2023 12:06 PM    Protein, total 7.0 01/30/2023 12:06 PM    Albumin 3.7 01/30/2023 12:06 PM Globulin 3.3 01/30/2023 12:06 PM    A-G Ratio 1.1 01/30/2023 12:06 PM    ALT (SGPT) 25 01/30/2023 12:06 PM      Vitals:    01/30/23 1710 01/31/23 0738 01/31/23 1106 01/31/23 1540   BP: (!) 156/81 (!) 146/79 124/74 113/75   Pulse: 85 82 83 75   Resp: 16 20 20 14   Temp: 97.9 °F (36.6 °C) 98.5 °F (36.9 °C) 98.4 °F (36.9 °C) 98.3 °F (36.8 °C)   SpO2: 96%  95% 97%         Current Facility-Administered Medications   Medication Dose Route Frequency Provider Last Rate Last Admin    nicotine (NICODERM CQ) 21 mg/24 hr patch 1 Patch  1 Patch TransDERmal DAILY Talisha De La Cruz MD        ziprasidone (GEODON) capsule 40 mg  40 mg Oral BID WITH MEALS Talisha De La Cruz MD   40 mg at 02/01/23 0830    OLANZapine (ZyPREXA) tablet 5 mg  5 mg Oral Q6H PRN Talisha De La Cruz MD        haloperidol lactate (HALDOL) injection 5 mg  5 mg IntraMUSCular Q6H PRN Talisha De La Cruz MD   5 mg at 01/31/23 0921    benztropine (COGENTIN) tablet 1 mg  1 mg Oral BID PRN Talisha De La Cruz MD        diphenhydrAMINE (BENADRYL) injection 50 mg  50 mg IntraMUSCular BID PRN Talisha De La Cruz MD   50 mg at 01/31/23 0920    hydrOXYzine HCL (ATARAX) tablet 50 mg  50 mg Oral TID PRN Talisha De La Cruz MD   50 mg at 01/30/23 2215    LORazepam (ATIVAN) 2 mg/mL injection 1 mg  1 mg IntraMUSCular Q4H PRN Talisha De La Cruz MD   1 mg at 01/31/23 2986    traZODone (DESYREL) tablet 50 mg  50 mg Oral QHS PRN Talisha De La Cruz MD   50 mg at 01/30/23 2215    acetaminophen (TYLENOL) tablet 650 mg  650 mg Oral Q4H PRN Talisha De La Cruz MD        magnesium hydroxide (MILK OF MAGNESIA) 400 mg/5 mL oral suspension 30 mL  30 mL Oral DAILY PRN Geri Enriquez MD         Mental Status Exam:  Eye contact: WNL  Grooming: fair  Psychomotor activity: WNL  Speech is verbose. Mood is \"ok\"  Affect:euthymic  Perception: no paranoia or delusions  Suicidal ideation: no SI  Cognition is grossly intact    Physical Exam:  Body habitus: There is no height or weight on file to calculate BMI.   Musculoskeletal system: normal gait  Tremor - neg  Cog wheeling - neg      Assessment and Plan:  Colbert Ahumada meets criteria for a diagnosis of   Bp d/o, unspecified. Will cross taper geodon with seroquel. Currently patient is on geodon 40mg po bid. She had her am dose of geodon 40mg po today. I will start her on seroquel 100mg po qhs tonight. Tomorrow am she will have seroquel 50mg po. Additionally I will start her on topamax 25mg po bid. I will reach out to her sister Sree Wolf for collateral now that patient provided for HONG. A coordinated, multidisplinary treatment team round was conducted with the patient, nurses, pharmcist,  and writer present. Discussions held with , and/or with family members; Complete current electronic health record for patient was reviewed in full including consultant notes, ancillary staff notes, nurses and tech notes, labs and vitals. I certify that this patients inpatient psychiatric hospital services furnished since the previous certification were, and continue to be, required for treatment that could reasonably be expected to improve the patient's condition, or for diagnostic study, and that the patient continues to need, on a daily basis, active treatment furnished directly by or requiring the supervision of inpatient psychiatric facility personnel. In addition, the hospital records show that services furnished were intensive treatment services, admission or related services, or equivalent services.

## 2023-02-02 VITALS
OXYGEN SATURATION: 95 % | TEMPERATURE: 97.5 F | RESPIRATION RATE: 16 BRPM | HEART RATE: 80 BPM | SYSTOLIC BLOOD PRESSURE: 134 MMHG | DIASTOLIC BLOOD PRESSURE: 68 MMHG

## 2023-02-02 PROCEDURE — 74011250637 HC RX REV CODE- 250/637: Performed by: PSYCHIATRY & NEUROLOGY

## 2023-02-02 RX ORDER — TOPIRAMATE 25 MG/1
25 TABLET ORAL 2 TIMES DAILY WITH MEALS
Qty: 14 TABLET | Refills: 0 | Status: SHIPPED | OUTPATIENT
Start: 2023-02-02 | End: 2023-02-09

## 2023-02-02 RX ORDER — QUETIAPINE FUMARATE 50 MG/1
TABLET, FILM COATED ORAL
Qty: 21 TABLET | Refills: 0 | Status: SHIPPED | OUTPATIENT
Start: 2023-02-02 | End: 2023-02-09

## 2023-02-02 RX ADMIN — MAGNESIUM HYDROXIDE 30 ML: 400 SUSPENSION ORAL at 10:59

## 2023-02-02 RX ADMIN — TOPIRAMATE 25 MG: 25 TABLET, FILM COATED ORAL at 08:31

## 2023-02-02 RX ADMIN — QUETIAPINE FUMARATE 50 MG: 25 TABLET ORAL at 08:31

## 2023-02-02 NOTE — BH NOTES
Patient requested Milk of Magnesia . Staff  presented patient the medication, she asked, \"did you put a HAPPY pill in my medication? \"  Staff replied, \"no happy pill is in this medication. \"  Patient refused to take the medication. Will continue to educate patient on the benefits of milk of magnesia.

## 2023-02-02 NOTE — PROGRESS NOTES
Problem: Risk for Elopement  Goal: Patient will not exit the unit/facility without proper escort  Outcome: Progressing Towards Goal     Problem: Altered Thought Process (Adult/Pediatric)  Goal: *STG: Remains safe in hospital  Outcome: Progressing Towards Goal  Goal: *STG: Complies with medication therapy  Outcome: Progressing Towards Goal
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
## 2023-02-02 NOTE — INTERDISCIPLINARY ROUNDS
Behavioral Health Interdisciplinary Rounds     Patient Name: Sandra Cummins  Age: 60 y.o. Room/Bed:  [facility]  Primary Diagnosis: <principal problem not specified>   Admission Status:  She was released from her hearing       Readmission within 30 days: no  Power of  in place: no  Patient requires a blocked bed: no          Reason for blocked bed:   Sleep hours: 3       Participation in Care/Groups:  no  Medication Compliant?: Yes  PRNS (last 24 hours): Sleep Aid    Restraints (last 24 hours):  no  __________________________________________________  OQ Admission Analysis Survey completed:  OQ Admission Analysis Survey score:  OQ Discharge Analysis Survey completed:  OQ Discharge Analysis Survey score:   __________________________________________________     Alcohol screening (AUDIT) completed -  AUDIT Score: 0  If applicable, date SBIRT discussed in treatment team AND documented:    Tobacco - patient is a smoker: Have You Used Tobacco in the Past 30 Days: Yes  Illegal Drugs use: Have You Used Any Illegal Substances Over the Past 12 Months: No    24 hour chart check complete: yes     _______________________________________________    Patient goal(s) for today:   Treatment team focus/goals: Plan for discharge.    Progress note: she was released from her hearing - family members are aware she was released      Spiritual Care Consult:   Financial concerns/prescription coverage:  [employer] [facility]   Family contact: sister                        Family requesting physician contact today:    Discharge plan: she was released from her hearing   Access to weapons :           no                                                   Outpatient provider(s): Dr. Dipika Sam at [facility]   Patient's preferred phone number for follow up call :   Patient's preferred e-mail address :    LOS:  3  Expected LOS: today     Participating treatment team members: LUIZA Rao- Dr. Lio Briggs Karen Gonzalez

## 2023-02-02 NOTE — BH NOTES
PRN Medication Documentation    Specific patient behavior that led to need for PRN medication: Patient requested medication for insomnia  Staff interventions attempted prior to PRN being given: Encouraged deep breathing  PRN medication given: Trazodone 50 mg PO  Patient response/effectiveness of PRN medication: Will continue to monitor. 0126 - Patient is hypervigilant in the milieu. Patient was observed (every 20 minutes) pacing the milieu, asking for cigarettes, pushing and looking out of exit door. Staff offered PRN medications for anxiety but patient refused. Will continue to monitor.

## 2023-02-02 NOTE — BH NOTES
Behavioral Health Transition Record to Provider    Patient Name: Benedicto Marrufo  YOB: 1960  Medical Record Number: [id number]  Date of Admission: 1/30/2023  Date of Discharge: 2/2/2023    Attending Provider: No att. providers found  Discharging Provider: Dr. Odilia Rowan   To contact this individual call [phone] and ask the  to page. If unavailable, ask to be transferred to [facility] Provider on call. [facility] Provider will be available on call 24/7 and during holidays. Primary Care Provider: Saqib Alfaro MD    No Known Allergies    Reason for Admission: CHIEF COMPLAINT:  \"You need to sit in the chair, sir. \"     HISTORY OF PRESENTING COMPLAINT:  Benedicto Marrufo is a 60 y.o. WHITE/NON- female who is currently admitted to the acute side of 7th floor behavioral health Unit at [place] [address] report patient requested to leave the hospital, saying that her case is that of a mistaken identity and that it is her sister who needs to be admitted here. She had to be transferred from the general to the acute side because of her behaviors, as she attempted to elope. Initially she had agreed to be admitted voluntarily, but requested to leave [place]. Staff called me after hours yesterday to inform me of patient's behavior and her request. I recommended staff contact Genesis PORTILLO for prescreening her. She is currently here on TDO. This morning in treatment team, nursing updates me that patient received haldol 5mg and ativan 1mg for agitation. She had received   Upon my evaluation, Stephanie Zazueta, says that she needs a cigarette. She says that she can't smoke a nicotine patch or a lozenge. She wasn't able to stay awake fully for the duration of the evaluation. She would nod off frequently. She did tell me that she was experiencing audio hallucinations of her sister talking to her. I suggested we defer her full evaluation until a time when she has rested. She became upset and wouldn't heed our requests for leaving the treatment team room. We asked staff to help her sit in a wheelchair, but still she refused. I informed her that I would request security to assist in helping her go to her room. Eventually she was agreeable to transfer from the couch to the wheelchair, at which time staff helped her go to her room to rest.     She told me that she has been taking Geodon 20mg po qday and 40mg po qhs, but no other psychiatric medications. Her prior record shows rx for wellbutrin. Admission Diagnosis: Bipolar 1 disorder ([facility] [address] [F31.9]    * No surgery found *    Results for orders placed or performed during the hospital encounter of 01/30/23   URINE CULTURE HOLD SAMPLE    Specimen: Urine   Result Value Ref Range    Urine culture hold        Urine on hold in Microbiology dept for 2 days. If unpreserved urine is submitted, it cannot be used for addtional testing after 24 hours, recollection will be required.    COVID-19 WITH INFLUENZA A/B   Result Value Ref Range    SARS-CoV-2 by PCR Not detected NOTD      Influenza A by PCR Not detected NOTD      Influenza B by PCR Not detected NOTD     URINALYSIS W/MICROSCOPIC   Result Value Ref Range    Color YELLOW/STRAW      Appearance CLEAR CLEAR      Specific gravity 1.005 1.003 - 1.030      pH (UA) 7.5 5.0 - 8.0      Protein Negative NEG mg/dL    Glucose Negative NEG mg/dL    Ketone Negative NEG mg/dL    Bilirubin Negative NEG      Blood Negative NEG      Urobilinogen 0.2 0.2 - 1.0 EU/dL    Nitrites Negative NEG      Leukocyte Esterase TRACE (A) NEG      WBC 0-4 0 - 4 /hpf    RBC 0-5 0 - 5 /hpf    Epithelial cells FEW FEW /lpf    Bacteria Negative NEG /hpf    Hyaline cast 0-2 0 - 5 /lpf   DRUG SCREEN, URINE   Result Value Ref Range    AMPHETAMINES Negative NEG      BARBITURATES Negative NEG      BENZODIAZEPINES Negative NEG      COCAINE Negative NEG      METHADONE Negative NEG      OPIATES Negative NEG      PCP(PHENCYCLIDINE) Negative NEG      THC (TH-CANNABINOL) Negative NEG      Drug screen comment (NOTE)    CBC WITH AUTOMATED DIFF   Result Value Ref Range    WBC 6.4 3.6 - 11.0 K/uL    RBC 4.52 3.80 - 5.20 M/uL    HGB 14.2 11.5 - 16.0 g/dL    HCT 42.9 35.0 - 47.0 %    MCV 94.9 80.0 - 99.0 FL    MCH 31.4 26.0 - 34.0 PG    MCHC 33.1 30.0 - 36.5 g/dL    RDW 12.6 11.5 - 14.5 %    PLATELET 956 565 - 271 K/uL    MPV 10.1 8.9 - 12.9 FL    NRBC 0.0 0  WBC    ABSOLUTE NRBC 0.00 0.00 - 0.01 K/uL    NEUTROPHILS 59 32 - 75 %    LYMPHOCYTES 26 12 - 49 %    MONOCYTES 8 5 - 13 %    EOSINOPHILS 6 0 - 7 %    BASOPHILS 1 0 - 1 %    IMMATURE GRANULOCYTES 0 0.0 - 0.5 %    ABS. NEUTROPHILS 3.8 1.8 - 8.0 K/UL    ABS. LYMPHOCYTES 1.6 0.8 - 3.5 K/UL    ABS. MONOCYTES 0.5 0.0 - 1.0 K/UL    ABS. EOSINOPHILS 0.4 0.0 - 0.4 K/UL    ABS. BASOPHILS 0.1 0.0 - 0.1 K/UL    ABS. IMM. GRANS. 0.0 0.00 - 0.04 K/UL    DF AUTOMATED     METABOLIC PANEL, COMPREHENSIVE   Result Value Ref Range    Sodium 135 (L) 136 - 145 mmol/L    Potassium 3.7 3.5 - 5.1 mmol/L    Chloride 107 97 - 108 mmol/L    CO2 27 21 - 32 mmol/L    Anion gap 1 (L) 5 - 15 mmol/L    Glucose 118 (H) 65 - 100 mg/dL    BUN 12 6 - 20 MG/DL    Creatinine 0.70 0.55 - 1.02 MG/DL    BUN/Creatinine ratio 17 12 - 20      eGFR >60 >60 ml/min/1.73m2    Calcium 9.1 8.5 - 10.1 MG/DL    Bilirubin, total 0.4 0.2 - 1.0 MG/DL    ALT (SGPT) 25 12 - 78 U/L    AST (SGOT) 15 15 - 37 U/L    Alk. phosphatase 75 45 - 117 U/L    Protein, total 7.0 6.4 - 8.2 g/dL    Albumin 3.7 3.5 - 5.0 g/dL    Globulin 3.3 2.0 - 4.0 g/dL    A-G Ratio 1.1 1.1 - 2.2     ACETAMINOPHEN   Result Value Ref Range    Acetaminophen level <2 (L) 10 - 30 ug/mL   SALICYLATE   Result Value Ref Range    Salicylate level 3.3 2.8 - 20.0 MG/DL   ETHYL ALCOHOL   Result Value Ref Range    ALCOHOL(ETHYL),SERUM <10 <10 MG/DL       Immunizations administered during this encounter: There is no immunization history on file for this patient.     Screening for Metabolic Disorders for Patients on Antipsychotic Medications  (Data obtained from the EMR)    Estimated Body Mass Index  Estimated body mass index is 22.28 kg/m² as calculated from the following:    Height as of 1/30/14: 5' 8\" (1.727 m). Weight as of 2/9/14: 66.5 kg (146 lb 8 oz). Vital Signs/Blood Pressure  Visit Vitals  /68 (BP 1 Location: Left upper arm, BP Patient Position: Sitting)   Pulse 80   Temp 97.5 °F (36.4 °C)   Resp 16   SpO2 95%       Blood Glucose/Hemoglobin A1c  Lab Results   Component Value Date/Time    Glucose 118 (H) 01/30/2023 12:06 PM    Glucose 87 01/31/2014 06:54 AM       No results found for: HBA1C, [id number]     Lipid Panel  Lab Results   Component Value Date/Time    Cholesterol, total 161 01/31/2014 06:54 AM    HDL Cholesterol 73 01/31/2014 06:54 AM    LDL, calculated 77.4 01/31/2014 06:54 AM    Triglyceride 53 01/31/2014 06:54 AM    CHOL/HDL Ratio 2.2 01/31/2014 06:54 AM        Discharge Diagnosis: Bipolar Disorder, Unspecified    Discharge Plan: She was released from her hearing and family requested a Lyft home. The patient Ifeanyi Cazares exhibits the ability to control behavior in a less restrictive environment. Patient's level of functioning is improving. No assaultive/destructive behavior has been observed for the past 24 hours. No suicidal/homicidal threat or behavior has been observed for the past 24 hours. There is no evidence of serious medication side effects. Patient has not been in physical or protective restraints for at least the past 24 hours. If weapons involved, how are they secured? No weapons involved     Is patient aware of and in agreement with discharge plan?  She was released from her TDO court hearing to follow up with outpatient treatment     Arrangements for medication:Prescriptions given to the patient     Copy of discharge instructions to provider?:  yes, fax to Dr. Shaunna Jacob for transportation home:  family to drop off her purse and we will provide a Lyft home     Keep all follow up appointments as scheduled, continue to take prescribed medications per physician instructions. Mental health crisis number:  [phone] or your local mental health crisis line number at [facility] crisis         Discharge Medication List and Instructions:   Discharge Medication List as of 2/2/2023  9:48 AM        START taking these medications    Details   QUEtiapine (SEROquel) 50 mg tablet Take 1 Tablet by mouth daily AND 2 Tablets nightly. Do all this for 7 days. Indications: bp d/o, Print, Disp-21 Tablet, R-0      topiramate (TOPAMAX) 25 mg tablet Take 1 Tablet by mouth two (2) times daily (with meals) for 7 days. Indications: bp d/o, Print, Disp-14 Tablet, R-0           STOP taking these medications       ziprasidone (GEODON) 40 mg capsule Comments:   Reason for Stopping:         ziprasidone (GEODON) 20 mg capsule Comments:   Reason for Stopping:         traZODone (DESYREL) 100 mg tablet Comments:   Reason for Stopping:         meloxicam (MOBIC) 15 mg tablet Comments:   Reason for Stopping:               Unresulted Labs (24h ago, onward)      None          To obtain results of studies pending at discharge, please contact [phone]    Follow-up Information       Follow up With Specialties Details Why Contact Info    Dr. Kerri Uriostegui MD  Follow up  [address] [address]  Phone: [phone]    Jennifer Mcbride MD Psychiatry, [facility], [address] [place] [address]  [phone]              Advanced Directive:   Does the patient have an appointed surrogate decision maker? No  Does the patient have a Medical Advance Directive? No  Does the patient have a Psychiatric Advance Directive?  No  If the patient does not have a surrogate or Medical Advance Directive AND Psychiatric Advance Directive, the patient was offered information on these advance directives Patient declined to complete    Patient Instructions: Please continue all medications until otherwise directed by physician. Tobacco Cessation Discharge Plan:   Is the patient a smoker and needs referral for smoking cessation? Yes  Patient referred to the following for smoking cessation with an appointment? Refused     Patient was offered medication to assist with smoking cessation at discharge? Refused  Was education for smoking cessation added to the discharge instructions? Yes    Alcohol/Substance Abuse Discharge Plan:   Does the patient have a history of substance/alcohol abuse and requires a referral for treatment? No  Patient referred to the following for substance/alcohol abuse treatment with an appointment? No  Patient was offered medication to assist with alcohol cessation at discharge? No  Was education for substance/alcohol abuse added to discharge instructions? No    Patient discharged to Home; discussed with patient/caregiver and provided to the patient/caregiver either in hard copy or electronically.

## 2023-02-02 NOTE — DISCHARGE INSTRUCTIONS
DISCHARGE SUMMARY    NAME:Martha Julio  : 1960  MRN: [id number]    The patient Jc Cole exhibits the ability to control behavior in a less restrictive environment. Patient's level of functioning is improving. No assaultive/destructive behavior has been observed for the past 24 hours. No suicidal/homicidal threat or behavior has been observed for the past 24 hours. There is no evidence of serious medication side effects. Patient has not been in physical or protective restraints for at least the past 24 hours. If weapons involved, how are they secured? No weapons involved     Is patient aware of and in agreement with discharge plan? She was released from her TDO court hearing to follow up with outpatient treatment     Arrangements for medication:Prescriptions given to the patient     Copy of discharge instructions to provider?:  yes, fax to Dr. Savannah Hutchinson for transportation home:  family to drop off her purse and we will provide a Lyft home     Keep all follow up appointments as scheduled, continue to take prescribed medications per physician instructions.   Mental health crisis number:  710 or your local mental health crisis line number at [address] Emergency WARM LINE      1-832-171-MHAV ([phone])      M-F: 9am to 9pm      Sat & Sun: 5pm - 9pm  National suicide prevention lines:                             3-458-XTODSOT (8-801-305-529-791-5817)       9-374-917-TALK (1-765-862-840-757-5983)    Crisis Text Line:  Text HOME to 557886

## 2023-02-02 NOTE — PROGRESS NOTES
RELEASED FROM TDO HEARING    Problem: Discharge Planning  Goal: *Discharge to safe environment  Outcome: Resolved/Met  PLAN IS TO RETURN TO HER PRIVATE RESIDENCE     Problem: Discharge Planning  Goal: *Knowledge of medication management  Outcome: Resolved/Met  VERBALIZED UNDERSTANDING     Problem: Discharge Planning  Goal: *Knowledge of discharge instructions  Outcome: Resolved/Met   VERBALIZED UNDERSTANDING    .   DISCHARGE SUMMARY from Nurse    PATIENT INSTRUCTIONS:      What to do at Home:  Recommended activity: Activity as tolerated,     If   *  Please give a list of your current medications to your Primary Care Provider. *  Please update this list whenever your medications are discontinued, doses are      changed, or new medications (including over-the-counter products) are added. *  Please carry medication information at all times in case of emergency situations. These are general instructions for a healthy lifestyle:    No smoking/ No tobacco products/ Avoid exposure to second hand smoke  Surgeon General's Warning:  Quitting smoking now greatly reduces serious risk to your health. Obesity, smoking, and sedentary lifestyle greatly increases your risk for illness    A healthy diet, regular physical exercise & weight monitoring are important for maintaining a healthy lifestyle    You may be retaining fluid if you have a history of heart failure or if you experience any of the following symptoms:  Weight gain of 3 pounds or more overnight or 5 pounds in a week, increased swelling in our hands or feet or shortness of breath while lying flat in bed. Please call your doctor as soon as you notice any of these symptoms; do not wait until your next office visit. The discharge information has been reviewed with the patient. The patient verbalized understanding.   Discharge medications reviewed with the patient and appropriate educational materials and side effects teaching were provided.   ___________________________________________________________________________________________________________________________________

## 2023-02-02 NOTE — BH NOTES
Patient is visible pacing the hallway and pushing on exit doors. Staff talked to patient about safety and offered her PRN medications for anxiety. Patient refused medications. Will continue to monitor.

## 2023-02-10 ENCOUNTER — HOSPITAL ENCOUNTER (EMERGENCY)
Age: 63
Discharge: HOME | End: 2023-02-10
Attending: STUDENT IN AN ORGANIZED HEALTH CARE EDUCATION/TRAINING PROGRAM
Payer: COMMERCIAL

## 2023-02-10 VITALS
DIASTOLIC BLOOD PRESSURE: 77 MMHG | SYSTOLIC BLOOD PRESSURE: 128 MMHG | OXYGEN SATURATION: 97 % | TEMPERATURE: 98.6 F | RESPIRATION RATE: 16 BRPM | HEART RATE: 92 BPM

## 2023-02-10 DIAGNOSIS — G47.00 INSOMNIA, UNSPECIFIED TYPE: Primary | ICD-10-CM

## 2023-02-10 PROCEDURE — 99282 EMERGENCY DEPT VISIT SF MDM: CPT

## 2023-02-10 NOTE — ED TRIAGE NOTES
Triage: Pt arrives ambulatory from home reporting insomnia. She reports she is a \"bipolar 1, rapid cycler\" and a psychiatrist she refers to as \"Dr. Jone Sneed" has mistreated her. She has had a recent admission and hopes for Dr. Melody Key to help her find medications to treat her insomnia. She reports her insomnia is due to her obsession with Dr. Jone Sneed. Denies SI/HI. Not seeking psychiatric admission.

## 2023-02-10 NOTE — ED PROVIDER NOTES
HPI     Aubree Alba is a 60 y.o. female with Hx of bipolar disorder, insomnia, PTSD, breast CA who presents ambulatory by herself to [facility] ED with cc of insomnia. Patient was recently hospitalized, last week, on inpatient psychiatry at [facility]. She states that she returns to the emergency department secondary to concerns for insomnia. She states that she last slept well 2 nights ago, she states that she got up last night around 1:30 AM with \"obsessive thoughts about Dr. Tash Huerta and their business practices. \"  She states that her psychiatrist who she refers to as \"Dr. Angie Morgan" has a business model that really bothers her. She states that when she starts to think about it she \"pops up like a Jack-in-the-Box. \"  Patient expresses concerns because she has been a patient of this particular psychiatrist for at least 6 years. She states that she does not feel the staff there is a \"qualified to manage her mental health. \"    When asked what I can assist within the emergency department today, states \"I need to be knocked out. \"  Is already been to an urgent care today per the referral of her psychiatrist, states that she was given a prescription for Atarax which she took 50 mg of around 1138 this morning, in addition to her Topamax and Seroquel, and was not able to sleep. She states that her sister spoke with the patient's psychiatrist staff, was told that patient's psychiatrist had spoken with the inpatient psychiatrist on-call at [facility] today. She states that she was told to come to the emergency department to discuss ongoing management of the patient's insomnia with inpatient psychiatrist.     Does not want inpatient admission; no SI/ HI. Denies any F/C, N/V/D, cough, congestion, CP, SOB, dysuria, urinary frequency/hesitancy, flank pain. (+) tobacco abuse, denes alcohol, substance abuse. PCP: None    There are no other complaints, changes or physical findings at this time.       Past Medical History:   Diagnosis Date    Bipolar affective ([facility] [address]     Cancer [facility])     breast cancer    Psychiatric disorder        No past surgical history on file. No family history on file. Social History     Socioeconomic History    Marital status: SINGLE     Spouse name: Not on file    Number of children: Not on file    Years of education: Not on file    Highest education level: Not on file   Occupational History    Not on file   Tobacco Use    Smoking status: Every Day    Smokeless tobacco: Never   Substance and Sexual Activity    Alcohol use: Yes     Comment: none since Sunday    Drug use: Not on file    Sexual activity: Not on file   Other Topics Concern    Not on file   Social History Narrative    Not on file     Social Determinants of Health     Financial Resource Strain: Not on file   Food Insecurity: Not on file   Transportation Needs: Not on file   Physical Activity: Not on file   Stress: Not on file   Social Connections: Not on file   Intimate Partner Violence: Not on file   Housing Stability: Not on file         ALLERGIES: Patient has no known allergies. Review of Systems   Constitutional:  Negative for activity change, appetite change, chills and fever. HENT:  Negative for congestion, rhinorrhea and sore throat. Eyes:  Negative for visual disturbance. Respiratory:  Negative for cough and shortness of breath. Cardiovascular:  Negative for chest pain. Gastrointestinal:  Negative for abdominal pain, diarrhea, nausea and vomiting. Genitourinary:  Negative for dysuria, flank pain, frequency and urgency. Musculoskeletal:  Negative for arthralgias, myalgias and neck pain. Skin:  Negative for color change and rash. Neurological:  Negative for dizziness, weakness, light-headedness and headaches. Psychiatric/Behavioral:  Positive for agitation and sleep disturbance. Negative for behavioral problems and confusion. The patient is nervous/anxious.     All other systems reviewed and are negative. Vitals:    02/10/23 1648   BP: 128/77   Pulse: 92   Resp: 16   Temp: 98.6 °F (37 °C)   SpO2: 97%            Physical Exam  Vitals and nursing note reviewed. Constitutional:       General: She is not in acute distress. Appearance: She is well-developed. HENT:      Head: Normocephalic and atraumatic. Right Ear: External ear normal.      Left Ear: External ear normal.   Eyes:      Conjunctiva/sclera: Conjunctivae normal.      Pupils: Pupils are equal, round, and reactive to light. Cardiovascular:      Rate and Rhythm: Normal rate and regular rhythm. Heart sounds: Normal heart sounds. Pulmonary:      Effort: Pulmonary effort is normal.      Breath sounds: Normal breath sounds. Musculoskeletal:         General: Normal range of motion. Cervical back: Normal range of motion and neck supple. Skin:     General: Skin is warm and dry. Neurological:      Mental Status: She is alert and oriented to person, place, and time. Psychiatric:         Attention and Perception: She is inattentive. Mood and Affect: Mood is anxious. Speech: Speech is rapid and pressured. Behavior: Behavior is agitated. Thought Content: Thought content normal.         Cognition and Memory: Cognition and memory normal.         Judgment: Judgment is impulsive. Medical Decision Making  Differential diagnoses include bipolar, mood disorder, insomnia, medication regulation, manic episode    Patient presents to the emergency department with concern for follow-up with inpatient psychiatry after recent discharge for her ongoing insomnia. Patient is currently taking Atarax 50 in addition to all of her other medications with the hopes that she may go to bed. Did not attempt taking Atarax until the last 24 hours with no relief. Patient is not a danger to herself or others, her sister arrived later in the visit.   I spoke with [facility], who stated the on-call psychiatrist would have left for the day around 5 PM.  I spoke with Dr. Clyde Eli, he is aware of the patient, patient is to return tomorrow to see if she can get follow-up with inpatient psychiatry when they are actually here. Asked the patient to be here by 10 AM tomorrow morning. Patient states understanding and would like to return at that time. Procedures    Presentation, management, and disposition were discussed with the attending physician, Dr. Adria Meyer , who is in agreement with plan of care. LABORATORY TESTS:  No results found for this or any previous visit (from the past 12 hour(s)). IMAGING RESULTS:  No orders to display       MEDICATIONS GIVEN:  Medications - No data to display    IMPRESSION:  1. Insomnia, unspecified type        PLAN:  1. Discharge Medication List as of 2/10/2023  5:52 PM        2. Follow-up Information       Follow up With Specialties Details Why Contact [address], [facility] Emergency Medicine Go to  If symptoms worsen, As needed [address] [facility]  [phone]          3.  Return to ED if worse

## 2023-02-10 NOTE — DISCHARGE INSTRUCTIONS
Please continue to take your medications as directed; follow up with inpatient psychiatry for your consult tomorrow- return well  before 4:30 pm

## 2023-02-11 ENCOUNTER — HOSPITAL ENCOUNTER (EMERGENCY)
Age: 63
Discharge: LEFT BEFORE DISCHARGE | End: 2023-02-11
Attending: STUDENT IN AN ORGANIZED HEALTH CARE EDUCATION/TRAINING PROGRAM
Payer: COMMERCIAL

## 2023-02-11 VITALS
HEIGHT: 68 IN | HEART RATE: 86 BPM | RESPIRATION RATE: 18 BRPM | BODY MASS INDEX: 24.25 KG/M2 | DIASTOLIC BLOOD PRESSURE: 73 MMHG | WEIGHT: 160 LBS | OXYGEN SATURATION: 98 % | SYSTOLIC BLOOD PRESSURE: 139 MMHG | TEMPERATURE: 98 F

## 2023-02-11 PROCEDURE — 99282 EMERGENCY DEPT VISIT SF MDM: CPT | Performed by: FAMILY MEDICINE

## 2023-02-11 NOTE — BSMART NOTE
[facility] went to assess patient. However, patient was not in the waiting room when called. Apparently, patient left without telling nursing staff. Per triage, she denies SI/HI and A/V/H. Patient wanted to speak to Dr Kelton Restrepo about managing her medications. Charge and ED provider were informed patient had left ED prior to assessment.

## 2023-02-11 NOTE — ED TRIAGE NOTES
Pt arrives from home for medication issue. States she needs a \"tweak on psych meds\" due to not being able to sleep 8 hours a night. Came in last night and told to come back to see psych when they would be available.

## 2023-02-11 NOTE — ED PROVIDER NOTES
Patient is a 60-year-old female with past medical history of bipolar disorder, breast cancer presenting requesting to speak with psychiatry. Had a recent inpatient admission for suicidal ideation. Was seen yesterday due to complaints of insomnia and concern for medication problem. Taking hydroxyzine, Topamax, and Seroquel to try to sleep. Notes that she is waking up several times throughout the night and is not getting a full 8 hours of sleep which she typically does. Typically, lack of sleep can trigger psychotic episodes for her. Notes that last night she woke up, but she was able to fall back asleep and it was a little bit better than the previous nights. Denies any suicidal or homicidal ideation. Reports that she has problems with obsessive thoughts. Past Medical History:   Diagnosis Date    Bipolar affective ([facility] [address]     [facility]     breast cancer    Psychiatric disorder        No past surgical history on file. No family history on file. Social History     Socioeconomic History    Marital status: SINGLE     Spouse name: Not on file    Number of children: Not on file    Years of education: Not on file    Highest education level: Not on file   Occupational History    Not on file   Tobacco Use    Smoking status: Every Day    Smokeless tobacco: Never   Substance and Sexual Activity    Alcohol use: Yes     Comment: none since Sunday    Drug use: Not on file    Sexual activity: Not on file   Other Topics Concern    Not on file   Social History Narrative    Not on file     Social Determinants of Health     Financial Resource Strain: Not on file   Food Insecurity: Not on file   Transportation Needs: Not on file   Physical Activity: Not on file   Stress: Not on file   Social Connections: Not on file   Intimate Partner Violence: Not on file   Housing Stability: Not on file         ALLERGIES: Patient has no known allergies.     Review of Systems   Constitutional:  Negative for fever and unexpected weight change. HENT:  Negative for congestion. Eyes:  Negative for visual disturbance. Respiratory:  Negative for cough, chest tightness and shortness of breath. Cardiovascular:  Negative for chest pain and palpitations. Gastrointestinal:  Negative for abdominal pain, diarrhea, nausea and vomiting. Endocrine: Negative for polyuria. Genitourinary:  Negative for dysuria and flank pain. Musculoskeletal:  Negative for back pain. Skin:  Negative for color change. Allergic/Immunologic: Negative for immunocompromised state. Neurological:  Negative for dizziness and headaches. Hematological:  Negative for adenopathy. Psychiatric/Behavioral:  Positive for sleep disturbance. Negative for agitation. The patient is nervous/anxious. Vitals:    02/11/23 1101   BP: 139/73   Pulse: 86   Resp: 18   Temp: 98 °F (36.7 °C)   SpO2: 98%   Weight: 72.6 kg (160 lb)   Height: 5' 8\" (1.727 m)            Physical Exam  Vitals and nursing note reviewed. Constitutional:       General: She is not in acute distress. Appearance: Normal appearance. She is normal weight. HENT:      Head: Atraumatic. Eyes:      Conjunctiva/sclera: Conjunctivae normal.      Pupils: Pupils are equal, round, and reactive to light. Cardiovascular:      Rate and Rhythm: Normal rate. Pulmonary:      Effort: Pulmonary effort is normal. No respiratory distress. Abdominal:      General: Abdomen is flat. Musculoskeletal:         General: Normal range of motion. Cervical back: Neck supple. Skin:     General: Skin is warm and dry. Capillary Refill: Capillary refill takes less than 2 seconds. Neurological:      General: No focal deficit present. Mental Status: She is alert and oriented to person, place, and time. Mental status is at baseline. Psychiatric:         Attention and Perception: Attention and perception normal.         Mood and Affect: Mood is anxious. Speech: Speech is rapid and pressured. Behavior: Behavior normal.         Thought Content: Thought content does not include homicidal or suicidal ideation. Medical Decision Making  Patient presenting with insomnia, requesting to speak to inpatient psychiatry. Reviewed ER visit yesterday. Apparently, presented too late in the day to speak with inpatient psychiatry. Was advised by Dr. Madi Winston to return today around 10 AM to speak to psychiatry. We will consult be smart. She is not actively suicidal or homicidal.  She is very cooperative on my exam.  11:20 AM  Change of shift. Care of patient signed over to Dr. Luis Kaiser. Bedside handoff complete. Awaiting [facility] and psych consult.              Procedures

## 2023-07-10 LAB — MAMMOGRAPHY, EXTERNAL: NORMAL

## 2025-05-21 ENCOUNTER — OFFICE VISIT (OUTPATIENT)
Facility: CLINIC | Age: 65
End: 2025-05-21
Payer: MEDICARE

## 2025-05-21 VITALS
OXYGEN SATURATION: 97 % | DIASTOLIC BLOOD PRESSURE: 68 MMHG | BODY MASS INDEX: 32.58 KG/M2 | HEIGHT: 68 IN | WEIGHT: 215 LBS | HEART RATE: 78 BPM | SYSTOLIC BLOOD PRESSURE: 114 MMHG | RESPIRATION RATE: 16 BRPM | TEMPERATURE: 97.8 F

## 2025-05-21 DIAGNOSIS — Z13.6 SCREENING FOR CARDIOVASCULAR CONDITION: ICD-10-CM

## 2025-05-21 DIAGNOSIS — E66.811 CLASS 1 OBESITY DUE TO EXCESS CALORIES WITHOUT SERIOUS COMORBIDITY WITH BODY MASS INDEX (BMI) OF 32.0 TO 32.9 IN ADULT: ICD-10-CM

## 2025-05-21 DIAGNOSIS — F31.9 BIPOLAR 1 DISORDER (HCC): ICD-10-CM

## 2025-05-21 DIAGNOSIS — Z13.1 SCREENING FOR DIABETES MELLITUS: ICD-10-CM

## 2025-05-21 DIAGNOSIS — G47.00 INSOMNIA, UNSPECIFIED TYPE: ICD-10-CM

## 2025-05-21 DIAGNOSIS — E66.09 CLASS 1 OBESITY DUE TO EXCESS CALORIES WITHOUT SERIOUS COMORBIDITY WITH BODY MASS INDEX (BMI) OF 32.0 TO 32.9 IN ADULT: ICD-10-CM

## 2025-05-21 DIAGNOSIS — Z11.59 NEED FOR HEPATITIS C SCREENING TEST: ICD-10-CM

## 2025-05-21 DIAGNOSIS — Z76.89 ENCOUNTER TO ESTABLISH CARE: Primary | ICD-10-CM

## 2025-05-21 DIAGNOSIS — Z85.820 HISTORY OF MELANOMA: ICD-10-CM

## 2025-05-21 PROCEDURE — 1123F ACP DISCUSS/DSCN MKR DOCD: CPT | Performed by: NURSE PRACTITIONER

## 2025-05-21 PROCEDURE — 3017F COLORECTAL CA SCREEN DOC REV: CPT | Performed by: NURSE PRACTITIONER

## 2025-05-21 PROCEDURE — 1036F TOBACCO NON-USER: CPT | Performed by: NURSE PRACTITIONER

## 2025-05-21 PROCEDURE — G8417 CALC BMI ABV UP PARAM F/U: HCPCS | Performed by: NURSE PRACTITIONER

## 2025-05-21 PROCEDURE — G8400 PT W/DXA NO RESULTS DOC: HCPCS | Performed by: NURSE PRACTITIONER

## 2025-05-21 PROCEDURE — 99204 OFFICE O/P NEW MOD 45 MIN: CPT | Performed by: NURSE PRACTITIONER

## 2025-05-21 PROCEDURE — 1090F PRES/ABSN URINE INCON ASSESS: CPT | Performed by: NURSE PRACTITIONER

## 2025-05-21 PROCEDURE — G8427 DOCREV CUR MEDS BY ELIG CLIN: HCPCS | Performed by: NURSE PRACTITIONER

## 2025-05-21 RX ORDER — TOPIRAMATE 100 MG/1
100 TABLET, FILM COATED ORAL NIGHTLY
COMMUNITY

## 2025-05-21 RX ORDER — TRAZODONE HYDROCHLORIDE 50 MG/1
50 TABLET ORAL NIGHTLY PRN
COMMUNITY
Start: 2024-05-30

## 2025-05-21 RX ORDER — LAMOTRIGINE 25 MG/1
200 TABLET ORAL DAILY
COMMUNITY
Start: 2024-06-04

## 2025-05-21 SDOH — ECONOMIC STABILITY: FOOD INSECURITY: WITHIN THE PAST 12 MONTHS, YOU WORRIED THAT YOUR FOOD WOULD RUN OUT BEFORE YOU GOT MONEY TO BUY MORE.: NEVER TRUE

## 2025-05-21 SDOH — ECONOMIC STABILITY: FOOD INSECURITY: WITHIN THE PAST 12 MONTHS, THE FOOD YOU BOUGHT JUST DIDN'T LAST AND YOU DIDN'T HAVE MONEY TO GET MORE.: NEVER TRUE

## 2025-05-21 ASSESSMENT — PATIENT HEALTH QUESTIONNAIRE - PHQ9
3. TROUBLE FALLING OR STAYING ASLEEP: NOT AT ALL
5. POOR APPETITE OR OVEREATING: SEVERAL DAYS
2. FEELING DOWN, DEPRESSED OR HOPELESS: NOT AT ALL
4. FEELING TIRED OR HAVING LITTLE ENERGY: NOT AT ALL
SUM OF ALL RESPONSES TO PHQ QUESTIONS 1-9: 1
8. MOVING OR SPEAKING SO SLOWLY THAT OTHER PEOPLE COULD HAVE NOTICED. OR THE OPPOSITE, BEING SO FIGETY OR RESTLESS THAT YOU HAVE BEEN MOVING AROUND A LOT MORE THAN USUAL: NOT AT ALL
1. LITTLE INTEREST OR PLEASURE IN DOING THINGS: NOT AT ALL
SUM OF ALL RESPONSES TO PHQ QUESTIONS 1-9: 1
6. FEELING BAD ABOUT YOURSELF - OR THAT YOU ARE A FAILURE OR HAVE LET YOURSELF OR YOUR FAMILY DOWN: NOT AT ALL
SUM OF ALL RESPONSES TO PHQ QUESTIONS 1-9: 1
9. THOUGHTS THAT YOU WOULD BE BETTER OFF DEAD, OR OF HURTING YOURSELF: NOT AT ALL
10. IF YOU CHECKED OFF ANY PROBLEMS, HOW DIFFICULT HAVE THESE PROBLEMS MADE IT FOR YOU TO DO YOUR WORK, TAKE CARE OF THINGS AT HOME, OR GET ALONG WITH OTHER PEOPLE: NOT DIFFICULT AT ALL
SUM OF ALL RESPONSES TO PHQ QUESTIONS 1-9: 1

## 2025-05-21 ASSESSMENT — ENCOUNTER SYMPTOMS
NAUSEA: 0
EYE PAIN: 0
CONSTIPATION: 0
EYE REDNESS: 0
DIARRHEA: 0
BACK PAIN: 0
VOMITING: 0
GASTROINTESTINAL NEGATIVE: 1
SINUS PAIN: 0
RHINORRHEA: 0
SINUS PRESSURE: 0
COUGH: 0
EYES NEGATIVE: 1
BLOOD IN STOOL: 0
CHEST TIGHTNESS: 0
RESPIRATORY NEGATIVE: 1
SHORTNESS OF BREATH: 0
ABDOMINAL PAIN: 0

## 2025-05-21 NOTE — PROGRESS NOTES
Social History     Tobacco Use    Smoking status: Former     Current packs/day: 0.00     Types: Cigarettes     Quit date:      Years since quittin.4     Passive exposure: Past    Smokeless tobacco: Never   Substance Use Topics    Alcohol use: Not Currently      Family History   Problem Relation Age of Onset    Diabetes type 2  Mother     Cancer Father         exposition to chemicals    Thyroid Disease Sister     Celiac Disease Sister     Asthma Sister     No Known Problems Sister     No Known Problems Brother         Physical Exam   Vitals:       /68 (BP Site: Left Upper Arm, Patient Position: Sitting, BP Cuff Size: Medium Adult)   Pulse 78   Temp 97.8 °F (36.6 °C) (Oral)   Resp 16   Ht 1.727 m (5' 8\")   Wt 97.5 kg (215 lb)   SpO2 97%   BMI 32.69 kg/m²      Physical Exam  Vitals reviewed.   Constitutional:       General: She is not in acute distress.     Appearance: Normal appearance. She is not ill-appearing.   HENT:      Head: Normocephalic and atraumatic.   Cardiovascular:      Rate and Rhythm: Normal rate and regular rhythm.      Pulses: Normal pulses.      Heart sounds: Normal heart sounds. No murmur heard.  Pulmonary:      Effort: Pulmonary effort is normal.      Breath sounds: Normal breath sounds. No stridor. No wheezing, rhonchi or rales.   Abdominal:      General: Bowel sounds are normal. There is no distension.      Palpations: Abdomen is soft.      Tenderness: There is no abdominal tenderness.   Skin:     General: Skin is warm and dry.   Neurological:      Mental Status: She is alert.   Psychiatric:         Behavior: Behavior normal.

## 2025-06-23 ENCOUNTER — COMMUNITY OUTREACH (OUTPATIENT)
Facility: CLINIC | Age: 65
End: 2025-06-23

## 2025-06-23 DIAGNOSIS — Z13.1 SCREENING FOR DIABETES MELLITUS: ICD-10-CM

## 2025-06-23 DIAGNOSIS — E66.811 CLASS 1 OBESITY DUE TO EXCESS CALORIES WITHOUT SERIOUS COMORBIDITY WITH BODY MASS INDEX (BMI) OF 32.0 TO 32.9 IN ADULT: ICD-10-CM

## 2025-06-23 DIAGNOSIS — E66.09 CLASS 1 OBESITY DUE TO EXCESS CALORIES WITHOUT SERIOUS COMORBIDITY WITH BODY MASS INDEX (BMI) OF 32.0 TO 32.9 IN ADULT: ICD-10-CM

## 2025-06-23 DIAGNOSIS — Z11.59 NEED FOR HEPATITIS C SCREENING TEST: ICD-10-CM

## 2025-06-23 DIAGNOSIS — Z13.6 SCREENING FOR CARDIOVASCULAR CONDITION: ICD-10-CM

## 2025-06-23 LAB
ALBUMIN SERPL-MCNC: 4.2 G/DL (ref 3.5–5)
ALBUMIN/GLOB SERPL: 1.5 (ref 1.1–2.2)
ALP SERPL-CCNC: 111 U/L (ref 45–117)
ALT SERPL-CCNC: 33 U/L (ref 12–78)
ANION GAP SERPL CALC-SCNC: 4 MMOL/L (ref 2–12)
AST SERPL-CCNC: 20 U/L (ref 15–37)
BILIRUB SERPL-MCNC: 0.4 MG/DL (ref 0.2–1)
BUN SERPL-MCNC: 24 MG/DL (ref 6–20)
BUN/CREAT SERPL: 25 (ref 12–20)
CALCIUM SERPL-MCNC: 9.2 MG/DL (ref 8.5–10.1)
CHLORIDE SERPL-SCNC: 108 MMOL/L (ref 97–108)
CHOLEST SERPL-MCNC: 210 MG/DL
CO2 SERPL-SCNC: 28 MMOL/L (ref 21–32)
CREAT SERPL-MCNC: 0.97 MG/DL (ref 0.55–1.02)
ERYTHROCYTE [DISTWIDTH] IN BLOOD BY AUTOMATED COUNT: 13.2 % (ref 11.5–14.5)
EST. AVERAGE GLUCOSE BLD GHB EST-MCNC: 94 MG/DL
GLOBULIN SER CALC-MCNC: 2.8 G/DL (ref 2–4)
GLUCOSE SERPL-MCNC: 101 MG/DL (ref 65–100)
HBA1C MFR BLD: 4.9 % (ref 4–5.6)
HCT VFR BLD AUTO: 45.7 % (ref 35–47)
HDLC SERPL-MCNC: 59 MG/DL
HDLC SERPL: 3.6 (ref 0–5)
HGB BLD-MCNC: 14 G/DL (ref 11.5–16)
LDLC SERPL CALC-MCNC: 136 MG/DL (ref 0–100)
MCH RBC QN AUTO: 30.5 PG (ref 26–34)
MCHC RBC AUTO-ENTMCNC: 30.6 G/DL (ref 30–36.5)
MCV RBC AUTO: 99.6 FL (ref 80–99)
NRBC # BLD: 0 K/UL (ref 0–0.01)
NRBC BLD-RTO: 0 PER 100 WBC
PLATELET # BLD AUTO: 257 K/UL (ref 150–400)
PMV BLD AUTO: 11.1 FL (ref 8.9–12.9)
POTASSIUM SERPL-SCNC: 4.7 MMOL/L (ref 3.5–5.1)
PROT SERPL-MCNC: 7 G/DL (ref 6.4–8.2)
RBC # BLD AUTO: 4.59 M/UL (ref 3.8–5.2)
SODIUM SERPL-SCNC: 140 MMOL/L (ref 136–145)
TRIGL SERPL-MCNC: 75 MG/DL
TSH SERPL DL<=0.05 MIU/L-ACNC: 1.71 UIU/ML (ref 0.36–3.74)
VLDLC SERPL CALC-MCNC: 15 MG/DL
WBC # BLD AUTO: 5.9 K/UL (ref 3.6–11)

## 2025-06-24 ENCOUNTER — RESULTS FOLLOW-UP (OUTPATIENT)
Facility: CLINIC | Age: 65
End: 2025-06-24

## 2025-06-24 LAB
HCV AB SERPL QL IA: NORMAL
HCV IGG SERPL QL IA: NON REACTIVE S/CO RATIO